# Patient Record
Sex: FEMALE | Race: WHITE | Employment: FULL TIME | URBAN - METROPOLITAN AREA
[De-identification: names, ages, dates, MRNs, and addresses within clinical notes are randomized per-mention and may not be internally consistent; named-entity substitution may affect disease eponyms.]

---

## 2019-06-27 ENCOUNTER — HOSPITAL ENCOUNTER (EMERGENCY)
Age: 34
Discharge: HOME | End: 2019-06-27
Attending: EMERGENCY MEDICINE
Payer: COMMERCIAL

## 2019-06-27 VITALS
SYSTOLIC BLOOD PRESSURE: 133 MMHG | HEART RATE: 70 BPM | HEIGHT: 63 IN | DIASTOLIC BLOOD PRESSURE: 70 MMHG | RESPIRATION RATE: 16 BRPM | TEMPERATURE: 98.2 F | WEIGHT: 155 LBS | BODY MASS INDEX: 27.46 KG/M2 | OXYGEN SATURATION: 99 %

## 2019-06-27 DIAGNOSIS — L03.114 CELLULITIS OF LEFT ELBOW: Primary | ICD-10-CM

## 2019-06-27 LAB
BASOPHILS # BLD: 0.1 K/UL (ref 0–0.2)
BASOPHILS NFR BLD: 1 % (ref 0–2)
DIFFERENTIAL METHOD BLD: ABNORMAL
EOSINOPHIL # BLD: 0.2 K/UL (ref 0–0.8)
EOSINOPHIL NFR BLD: 3 % (ref 0.5–7.8)
ERYTHROCYTE [DISTWIDTH] IN BLOOD BY AUTOMATED COUNT: 12.4 % (ref 11.9–14.6)
HCT VFR BLD AUTO: 37.5 % (ref 35.8–46.3)
HGB BLD-MCNC: 12.5 G/DL (ref 11.7–15.4)
IMM GRANULOCYTES # BLD AUTO: 0 K/UL (ref 0–0.5)
IMM GRANULOCYTES NFR BLD AUTO: 0 % (ref 0–5)
LYMPHOCYTES # BLD: 2.7 K/UL (ref 0.5–4.6)
LYMPHOCYTES NFR BLD: 35 % (ref 13–44)
MCH RBC QN AUTO: 31.2 PG (ref 26.1–32.9)
MCHC RBC AUTO-ENTMCNC: 33.3 G/DL (ref 31.4–35)
MCV RBC AUTO: 93.5 FL (ref 79.6–97.8)
MONOCYTES # BLD: 0.7 K/UL (ref 0.1–1.3)
MONOCYTES NFR BLD: 8 % (ref 4–12)
NEUTS SEG # BLD: 4.1 K/UL (ref 1.7–8.2)
NEUTS SEG NFR BLD: 54 % (ref 43–78)
NRBC # BLD: 0 K/UL (ref 0–0.2)
PLATELET # BLD AUTO: 228 K/UL (ref 150–450)
PMV BLD AUTO: 11.1 FL (ref 9.4–12.3)
RBC # BLD AUTO: 4.01 M/UL (ref 4.05–5.2)
WBC # BLD AUTO: 7.7 K/UL (ref 4.3–11.1)

## 2019-06-27 PROCEDURE — 99283 EMERGENCY DEPT VISIT LOW MDM: CPT | Performed by: EMERGENCY MEDICINE

## 2019-06-27 PROCEDURE — 85025 COMPLETE CBC W/AUTO DIFF WBC: CPT

## 2019-06-27 NOTE — ED TRIAGE NOTES
C/o possible insect bite to left elbow. States seen by urgent care yesterday, compliant with abx prescribed. Area outlined yesterday, redness extending outside of area. Seen again by urgent care today, instructed to come to ed. Denies fevers.  Denies n/v.

## 2019-06-28 NOTE — ED NOTES
I have reviewed discharge instructions with the patient. The patient verbalized understanding. Patient left ED via Discharge Method: ambulatory to Home with spouse. Opportunity for questions and clarification provided. Patient given 0 scripts. To continue your aftercare when you leave the hospital, you may receive an automated call from our care team to check in on how you are doing. This is a free service and part of our promise to provide the best care and service to meet your aftercare needs.  If you have questions, or wish to unsubscribe from this service please call [phone]. Thank you for Choosing our [facility] Emergency Department.

## 2019-06-28 NOTE — ED PROVIDER NOTES
Radha Lazcano is a 26 y.o. female seen on 6/27/2019 at 8:52 PM in the [facility] EMERGENCY DEPT in room ERJ/J.    Chief Complaint   Patient presents with    Insect Bite     HPI:  75-year-old female presenting to the emergency department for evaluation of infection over her left elbow. She was initially seen in an urgent care and diagnosed with cellulitis of her left elbow. She was placed on doxycycline and has taken 3 doses of the medication. She presents today because the erythema has expanded approximately a centimeter beyond the markings placed at urgent care. It has extended on the distal aspect of the markings. However they also note that overall the redness has improved and she states that her discomfort has gotten significantly better as well. They had gone back to urgent care who evaluated the elbow and told them to come to the ER as the redness seemed to be progressing. She denies fever nausea vomiting diarrhea, or any other symptoms. Historian: patient    REVIEW OF SYSTEMS     Review of Systems   Constitutional: Negative for fever. HENT: Negative. Eyes: Negative. Respiratory: Negative for cough, chest tightness, shortness of breath and wheezing. Cardiovascular: Negative for chest pain. Gastrointestinal: Negative for abdominal distention, abdominal pain, constipation, diarrhea and vomiting. Endocrine: Negative. Genitourinary: Negative for dysuria, flank pain, frequency and urgency. Skin: Positive for color change. Neurological: Negative for dizziness, syncope and headaches. Psychiatric/Behavioral: Negative. All other systems reviewed and are negative. PAST MEDICAL HISTORY     No past medical history on file.   Past Surgical History:   Procedure Laterality Date    HX BREAST REDUCTION  2007    HX WISDOM TEETH EXTRACTION       Social History     Socioeconomic History    Marital status: SINGLE     Spouse name: Not on file    Number of children: Not on file    Years of education: Not on file    Highest education level: Not on file   Tobacco Use    Smoking status: Never Smoker    Smokeless tobacco: Never Used   Substance and Sexual Activity    Alcohol use: Yes    Drug use: No    Sexual activity: Yes     Partners: Male     Birth control/protection: Pill     Prior to Admission Medications   Prescriptions Last Dose Informant Patient Reported? Taking? ORSYTHIA 0.1-20 mg-mcg tab   Yes No   Sig: Take 1 Tab by mouth daily. Facility-Administered Medications: None     No Known Allergies     PHYSICAL EXAM       Vitals:    06/27/19 1914   BP: 136/68   Pulse: 69   Resp: 20   Temp: 98.2 °F (36.8 °C)   SpO2: 97%    Vital signs were reviewed. Physical Exam   Constitutional: She is oriented to person, place, and time. She appears well-developed and well-nourished. No distress. HENT:   Head: Normocephalic and atraumatic. Eyes: Pupils are equal, round, and reactive to light. EOM are normal.   Neck: Normal range of motion. Cardiovascular: Intact distal pulses. Pulmonary/Chest: Effort normal.   Musculoskeletal: Normal range of motion. She exhibits no edema, tenderness or deformity. No palpable effusion, full ROM of L elbow   Neurological: She is alert and oriented to person, place, and time. Skin:   Mild erythema over left elbow, mild increased warmth   Psychiatric: She has a normal mood and affect. Her behavior is normal.                MEDICAL DECISION MAKING     Differential Diagnosis:     MDM  Procedures    ED Course:  Patient's exam does appear consistent with cellulitis of the left elbow. However I think the cellulitis is likely improving. Overall her erythema is improved. While there is some slight progression over the distal aspect of the skin markings, the proximal aspect actually appears improved as well. There is no streaking erythema.   There are no findings concerning for septic arthritis, abscess, bursitis, etc.  I do not think that the patient requires IV antibiotics at this time. She is well appearing, nontoxic and appropriate for continued oral antibiotics. I have discussed with her reasons to return to the emergency department and she is comfortable with this plan. Disposition:  Discharge home  Diagnosis:  Left elbow cellulitis  ____________________________________________________________________  A portion of this note was generated using voice recognition dictation software. While the note has been reviewed for accuracy, please note certain words and phrases may not be transcribed as intended and some grammatical and/or typographical errors may be present.

## 2022-05-23 ENCOUNTER — OFFICE VISIT (OUTPATIENT)
Dept: URGENT CARE | Facility: CLINIC | Age: 37
End: 2022-05-23
Payer: COMMERCIAL

## 2022-05-23 VITALS
DIASTOLIC BLOOD PRESSURE: 70 MMHG | SYSTOLIC BLOOD PRESSURE: 115 MMHG | RESPIRATION RATE: 12 BRPM | OXYGEN SATURATION: 99 % | TEMPERATURE: 97.7 F | HEART RATE: 79 BPM

## 2022-05-23 DIAGNOSIS — L29.9 ITCHING: ICD-10-CM

## 2022-05-23 DIAGNOSIS — H10.9 CONJUNCTIVITIS OF RIGHT EYE, UNSPECIFIED CONJUNCTIVITIS TYPE: ICD-10-CM

## 2022-05-23 DIAGNOSIS — H10.11 ALLERGIC CONJUNCTIVITIS OF RIGHT EYE: Primary | ICD-10-CM

## 2022-05-23 PROCEDURE — 99213 OFFICE O/P EST LOW 20 MIN: CPT | Performed by: PHYSICIAN ASSISTANT

## 2022-05-23 RX ORDER — HYDROXYZINE HYDROCHLORIDE 25 MG/1
25 TABLET, FILM COATED ORAL EVERY 8 HOURS PRN
Qty: 30 TABLET | Refills: 0 | Status: SHIPPED | OUTPATIENT
Start: 2022-05-23 | End: 2022-06-02

## 2022-05-23 ASSESSMENT — ENCOUNTER SYMPTOMS
COUGH: 0
EYE ITCHING: 1
NAUSEA: 0
ABDOMINAL PAIN: 0
DIARRHEA: 0
CHEST TIGHTNESS: 0
SHORTNESS OF BREATH: 0
VOMITING: 0
WHEEZING: 0
TROUBLE SWALLOWING: 0
SORE THROAT: 0

## 2022-05-23 ASSESSMENT — VISUAL ACUITY: OU: 1

## 2022-05-23 NOTE — PROGRESS NOTES
Mckayla Perez (: 1985) is a 43 y.o. female is here for evaluation of the following chief complaint(s):  Chief Complaint   Patient presents with    Eye Problem        ASSESSMENT/PLAN:  Below is the assessment and plan developed based on review of pertinent history, physical exam, labs, studies, and medications. Milagro Mosley was seen today for eye problem. Diagnoses and all orders for this visit:    Allergic conjunctivitis of right eye    Itching  -     hydrOXYzine (ATARAX) 25 MG tablet; Take 1 tablet by mouth every 8 hours as needed for Itching       Patient to try pataday eye drops as discussed. Hydroxyzine as needed for itching. F/u in 48 hours if no relief in symptoms, sooner if any worsening    Patient to follow up with PCP as discussed. Patient to return to clinic if symptoms persist or worsen. We discussed reasons to present to the ER or call 911, including but not limited to headache, blurred vision, speech disturbance, difficulty with ambulation/gait, numbness, tingling, weakness, chest pain, shortness of breath, syncope. Patient verbalizes understanding and agreement. SUBJECTIVE/OBJECTIVE:  Patient is a 42 y/o female who presents today endorsing itchy eyes for the past few days, worse on the right side. She says she has seasonal allergies and rode her bike the other day which could have triggered her symptoms. She has taken zyrtec the past two days with minimal relief. She denies blurry vision or loss of vision, pain with extraocular movements, drainage from eye. Patient denies fever, chills, chest pain, shortness of breath, nausea, vomiting, diarrhea, abdominal or back pain, lightheadedness, dizziness, weakness. No Known Allergies    No past medical history on file.   Past Surgical History:   Procedure Laterality Date    BREAST REDUCTION SURGERY      WISDOM TOOTH EXTRACTION       Family History   Problem Relation Age of Onset    Diabetes Other         grandmother   Rui Loser Cancer Other         renal grand father.  Dementia Other         maternal grand father        Social Connections:     Frequency of Communication with Friends and Family: Not on file    Frequency of Social Gatherings with Friends and Family: Not on file    Attends Zoroastrian Services: Not on file    Active Member of Clubs or Organizations: Not on file    Attends Club or Organization Meetings: Not on file    Marital Status: Not on file          Review of Systems   Constitutional: Negative for chills, fatigue and fever. HENT: Negative for sore throat and trouble swallowing. Eyes: Positive for itching. Respiratory: Negative for cough, chest tightness, shortness of breath and wheezing. Cardiovascular: Negative for chest pain, palpitations and leg swelling. Gastrointestinal: Negative for abdominal pain, diarrhea, nausea and vomiting. Genitourinary: Negative for dysuria and flank pain. Skin: Negative for rash. Neurological: Negative for dizziness, light-headedness and headaches. /70   Pulse 79   Temp 97.7 °F (36.5 °C)   Resp 12   SpO2 99%      Physical Exam  Vitals and nursing note reviewed. Exam conducted with a chaperone present. Constitutional:       Appearance: Normal appearance. HENT:      Head: Normocephalic and atraumatic. Eyes:      General: Lids are normal. Lids are everted, no foreign bodies appreciated. Vision grossly intact. Gaze aligned appropriately. Right eye: No foreign body, discharge or hordeolum. Left eye: No foreign body, discharge or hordeolum. Extraocular Movements:      Right eye: Normal extraocular motion. Left eye: Normal extraocular motion. Conjunctiva/sclera: Conjunctivae normal.      Right eye: Right conjunctiva is not injected. No chemosis, exudate or hemorrhage. Left eye: Left conjunctiva is not injected. No chemosis, exudate or hemorrhage. Cardiovascular:      Rate and Rhythm: Normal rate and regular rhythm. Pulses: Normal pulses. Heart sounds: Normal heart sounds. Pulmonary:      Effort: Pulmonary effort is normal.      Breath sounds: Normal breath sounds. No wheezing, rhonchi or rales. Abdominal:      Tenderness: There is no guarding. Musculoskeletal:      Cervical back: Normal range of motion. Skin:     General: Skin is warm and dry. Neurological:      Mental Status: She is alert. Psychiatric:         Mood and Affect: Mood normal.         Behavior: Behavior normal.           An electronic signature was used to authenticate this note.   -- JERMAN Cowan

## 2022-05-25 RX ORDER — OFLOXACIN 3 MG/ML
1 SOLUTION/ DROPS OPHTHALMIC 4 TIMES DAILY
Qty: 5 ML | Refills: 0 | Status: SHIPPED | OUTPATIENT
Start: 2022-05-25 | End: 2022-06-04

## 2022-09-23 ENCOUNTER — OFFICE VISIT (OUTPATIENT)
Dept: URGENT CARE | Facility: CLINIC | Age: 37
End: 2022-09-23
Payer: COMMERCIAL

## 2022-09-23 VITALS
RESPIRATION RATE: 29 BRPM | SYSTOLIC BLOOD PRESSURE: 138 MMHG | HEIGHT: 63 IN | WEIGHT: 160 LBS | TEMPERATURE: 99.2 F | BODY MASS INDEX: 28.35 KG/M2 | OXYGEN SATURATION: 99 % | DIASTOLIC BLOOD PRESSURE: 90 MMHG | HEART RATE: 112 BPM

## 2022-09-23 DIAGNOSIS — K64.4 EXTERNAL HEMORRHOID: Primary | ICD-10-CM

## 2022-09-23 PROCEDURE — 99212 OFFICE O/P EST SF 10 MIN: CPT | Performed by: PHYSICIAN ASSISTANT

## 2022-09-23 RX ORDER — HYDROCORTISONE ACETATE 25 MG/1
25 SUPPOSITORY RECTAL 2 TIMES DAILY PRN
Qty: 24 SUPPOSITORY | Refills: 0 | Status: SHIPPED | OUTPATIENT
Start: 2022-09-23 | End: 2022-10-07

## 2022-09-23 ASSESSMENT — ENCOUNTER SYMPTOMS
ANAL BLEEDING: 0
BLOOD IN STOOL: 0
RECTAL PAIN: 0
ABDOMINAL PAIN: 0

## 2022-09-23 NOTE — PROGRESS NOTES
Priscilla Weeks (: 1985) is a 43 y.o. female is here for evaluation of the following chief complaint(s):  Chief Complaint   Patient presents with    Hemorrhoids          SUBJECTIVE/OBJECTIVE:  Pt noticed a sensation around her rectum that she believes may be a hemmorhoid. No pain, no blood in stool. She does report that she was able to reduce it but that it reoccurred today. Has not tried any medications. Does report a history of IBS-C. Hemorrhoids  Pertinent negatives include no abdominal pain, chills or fever. Allergies   Allergen Reactions    Iohexol Hives     Other reaction(s): Hives/Swelling-Allergy  HIVE  TO RT. NECK POST CONTRAST INJECTION  HIVE  TO RT. NECK POST CONTRAST INJECTION       No past medical history on file. Past Surgical History:   Procedure Laterality Date    BREAST REDUCTION SURGERY      WISDOM TOOTH EXTRACTION       Family History   Problem Relation Age of Onset    Diabetes Other         grandmother    Cancer Other         renal grand father. Dementia Other         maternal grand father     Social Connections: Not on file          Review of Systems   Constitutional:  Negative for chills and fever. Gastrointestinal:  Positive for hemorrhoids. Negative for abdominal pain, anal bleeding, blood in stool and rectal pain. BP (!) 138/90 (Site: Left Upper Arm, Position: Sitting, Cuff Size: Medium Adult)   Pulse (!) 112   Temp 99.2 °F (37.3 °C) (Oral)   Resp 29   Ht 5' 3\" (1.6 m)   Wt 160 lb (72.6 kg)   LMP 2022   SpO2 99% Comment: ROOM AIR  BMI 28.34 kg/m²      Physical Exam  Genitourinary:     Rectum: External hemorrhoid present. No mass, tenderness or anal fissure. ASSESSMENT/PLAN:  Below is the assessment and plan developed based on review of pertinent history, physical exam, labs, studies, and medications. [employer] was seen today for hemorrhoids.     Diagnoses and all orders for this visit:    External hemorrhoid    Other orders  - hydrocortisone (ANUSOL-HC) 25 MG suppository; Place 1 suppository rectally 2 times daily as needed for Hemorrhoids         Patient to follow up with PCP as discussed. Patient to return to clinic if symptoms persist or worsen. We discussed reasons to present to the ER or call 911. Patient verbalizes understanding and agreement. An electronic signature was used to authenticate this note.   -- JERMAN Crump